# Patient Record
Sex: FEMALE | Race: WHITE | NOT HISPANIC OR LATINO
[De-identification: names, ages, dates, MRNs, and addresses within clinical notes are randomized per-mention and may not be internally consistent; named-entity substitution may affect disease eponyms.]

---

## 2020-02-14 PROBLEM — Z00.00 ENCOUNTER FOR PREVENTIVE HEALTH EXAMINATION: Status: ACTIVE | Noted: 2020-02-14

## 2022-06-14 ENCOUNTER — APPOINTMENT (OUTPATIENT)
Dept: GASTROENTEROLOGY | Facility: HOSPITAL | Age: 87
End: 2022-06-14

## 2022-06-14 ENCOUNTER — TRANSCRIPTION ENCOUNTER (OUTPATIENT)
Age: 87
End: 2022-06-14

## 2022-06-19 ENCOUNTER — TRANSCRIPTION ENCOUNTER (OUTPATIENT)
Age: 87
End: 2022-06-19

## 2022-06-21 ENCOUNTER — NON-APPOINTMENT (OUTPATIENT)
Age: 87
End: 2022-06-21

## 2022-06-28 ENCOUNTER — APPOINTMENT (OUTPATIENT)
Dept: GERIATRICS | Facility: HOME HEALTH | Age: 87
End: 2022-06-28

## 2022-07-14 ENCOUNTER — APPOINTMENT (OUTPATIENT)
Dept: GERIATRICS | Facility: HOME HEALTH | Age: 87
End: 2022-07-14

## 2022-07-14 DIAGNOSIS — F32.A DEPRESSION, UNSPECIFIED: ICD-10-CM

## 2022-07-14 DIAGNOSIS — N18.9 CHRONIC KIDNEY DISEASE, UNSPECIFIED: ICD-10-CM

## 2022-07-14 DIAGNOSIS — Z51.5 ENCOUNTER FOR PALLIATIVE CARE: ICD-10-CM

## 2022-07-14 DIAGNOSIS — F03.91 UNSPECIFIED DEMENTIA WITH BEHAVIORAL DISTURBANCE: ICD-10-CM

## 2022-07-14 PROCEDURE — 99342 HOME/RES VST NEW LOW MDM 30: CPT

## 2022-07-15 PROBLEM — Z51.5 ENCOUNTER FOR PALLIATIVE CARE: Status: ACTIVE | Noted: 2022-07-15

## 2022-07-15 PROBLEM — F03.91 AGITATION DUE TO DEMENTIA: Status: ACTIVE | Noted: 2022-07-15

## 2022-07-15 PROBLEM — N18.9 CHRONIC KIDNEY DISEASE: Status: ACTIVE | Noted: 2022-07-15

## 2022-07-15 PROBLEM — F32.A DEPRESSION: Status: ACTIVE | Noted: 2022-07-15

## 2022-07-15 RX ORDER — QUETIAPINE 25 MG/1
25 TABLET, FILM COATED ORAL
Qty: 30 | Refills: 5 | Status: ACTIVE | COMMUNITY
Start: 2022-07-15

## 2022-07-15 RX ORDER — ZOLPIDEM TARTRATE 5 MG/1
5 TABLET ORAL
Qty: 14 | Refills: 0 | Status: ACTIVE | COMMUNITY
Start: 2022-03-09

## 2022-07-15 RX ORDER — SERTRALINE 25 MG/1
25 TABLET, FILM COATED ORAL
Refills: 0 | Status: ACTIVE | COMMUNITY
Start: 2022-07-15

## 2022-07-15 NOTE — HISTORY OF PRESENT ILLNESS
[FreeTextEntry1] : Agustina Patiño is a 89 y/o F w/ [facility] CKD III, stage I sacral decubitus,  recent admission to [facility] 6/11-6/19 for urosepsis, new onset AF started on Eliquis c/b by GIB (held and later restarted) found to have gastric ulcer\par \par Pt seen in bed, asleep, easily wakes up, answers questions appropriately, NAD. Per HHA, is confused at times. Eats very well. Regular BMs and urination. No pain. Sleeps well with Ambien/seroquel. OOB in wheelchair and goes outside. \par \par Otis (son): [phone]\par PCP Dr. Romero- is still able to go out and see

## 2022-07-15 NOTE — ASSESSMENT
[FreeTextEntry1] : S/w son Otis- he feels that patient is doing quite well and is getting her strength back post hospitalization in June. Her PCP is Dr. Romero and she is still able to go out and see him. \par \par Palliative care further explained. Chapincito Berg does not feel that she has any palliative care needs at this time and will reach out if/when she declines.\par \par

## 2022-07-15 NOTE — PHYSICAL EXAM
[General Appearance - Alert] : alert [General Appearance - In No Acute Distress] : in no acute distress [FreeTextEntry1] : Frail, thin [Sclera] : the sclera and conjunctiva were normal [Normal Oral Mucosa] : normal oral mucosa [Neck Appearance] : the appearance of the neck was normal [] : no respiratory distress [Exaggerated Use Of Accessory Muscles For Inspiration] : no accessory muscle use [Auscultation Breath Sounds / Voice Sounds] : lungs were clear to auscultation bilaterally [Edema] : there was no peripheral edema [Oriented To Time, Place, And Person] : oriented to person, place, and time

## 2022-10-16 ENCOUNTER — TRANSCRIPTION ENCOUNTER (OUTPATIENT)
Age: 87
End: 2022-10-16